# Patient Record
Sex: MALE | Race: WHITE | Employment: FULL TIME | URBAN - NONMETROPOLITAN AREA
[De-identification: names, ages, dates, MRNs, and addresses within clinical notes are randomized per-mention and may not be internally consistent; named-entity substitution may affect disease eponyms.]

---

## 2021-04-15 ENCOUNTER — OFFICE VISIT (OUTPATIENT)
Dept: FAMILY MEDICINE | Facility: OTHER | Age: 51
End: 2021-04-15
Attending: PHYSICIAN ASSISTANT
Payer: COMMERCIAL

## 2021-04-15 ENCOUNTER — TELEPHONE (OUTPATIENT)
Dept: SURGERY | Facility: OTHER | Age: 51
End: 2021-04-15

## 2021-04-15 VITALS
SYSTOLIC BLOOD PRESSURE: 114 MMHG | OXYGEN SATURATION: 96 % | WEIGHT: 257.6 LBS | DIASTOLIC BLOOD PRESSURE: 80 MMHG | TEMPERATURE: 95.9 F | RESPIRATION RATE: 16 BRPM | HEIGHT: 74 IN | HEART RATE: 82 BPM | BODY MASS INDEX: 33.06 KG/M2

## 2021-04-15 DIAGNOSIS — Z13.21 ENCOUNTER FOR VITAMIN DEFICIENCY SCREENING: ICD-10-CM

## 2021-04-15 DIAGNOSIS — Z13.220 SCREENING CHOLESTEROL LEVEL: ICD-10-CM

## 2021-04-15 DIAGNOSIS — Z12.11 COLON CANCER SCREENING: ICD-10-CM

## 2021-04-15 DIAGNOSIS — Z00.00 ROUTINE HISTORY AND PHYSICAL EXAMINATION OF ADULT: Primary | ICD-10-CM

## 2021-04-15 DIAGNOSIS — Z13.1 DIABETES MELLITUS SCREENING: ICD-10-CM

## 2021-04-15 DIAGNOSIS — Z13.29 SCREENING FOR THYROID DISORDER: ICD-10-CM

## 2021-04-15 LAB — INTERPRETATION ECG - MUSE: NORMAL

## 2021-04-15 PROCEDURE — 99386 PREV VISIT NEW AGE 40-64: CPT | Performed by: PHYSICIAN ASSISTANT

## 2021-04-15 PROCEDURE — 93000 ELECTROCARDIOGRAM COMPLETE: CPT | Performed by: INTERNAL MEDICINE

## 2021-04-15 SDOH — HEALTH STABILITY: MENTAL HEALTH: HOW MANY STANDARD DRINKS CONTAINING ALCOHOL DO YOU HAVE ON A TYPICAL DAY?: 1 OR 2

## 2021-04-15 SDOH — HEALTH STABILITY: MENTAL HEALTH: HOW OFTEN DO YOU HAVE 6 OR MORE DRINKS ON ONE OCCASION?: NEVER

## 2021-04-15 SDOH — HEALTH STABILITY: MENTAL HEALTH: HOW OFTEN DO YOU HAVE A DRINK CONTAINING ALCOHOL?: MONTHLY OR LESS

## 2021-04-15 ASSESSMENT — PAIN SCALES - GENERAL: PAINLEVEL: NO PAIN (0)

## 2021-04-15 ASSESSMENT — MIFFLIN-ST. JEOR: SCORE: 2089.25

## 2021-04-15 NOTE — PROGRESS NOTES
"SUBJECTIVE:   HPI  Geraldo Merida is a 50 year old male here for routine history physical exam and colon cancer screening.    Patient states he was in a cholesterol medication approximately 5 years ago but ran out of that medication after the first prescription and did not refill it.  He also reports a possible history of acid reflux for about 5 years that was responsive to Tums.  He states he cut out bread and this helped his symptoms.  On occasion he still has symptoms maybe once or twice a week.  Other than a possible history of hypercholesterolemia and acid reflux he denies any other medical conditions or disorders.  He rarely drinks alcohol and has never used any tobacco products.  No major surgeries or recent illnesses.  He states his father had a double lung transplant in his 50s that they suspect was due to chemical exposure in the mines.  He also has a sister with uterine cancer.  He has 5 sisters who are all alive.  No other diabetes, obesity, or heart disease in his family.  He is due for colon cancer screening but denies any blood in his stool or GI upset.  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  No concern for anxiety or depression.    Allergies:  No Known Allergies    ROS:  As above otherwise unremarkable.     OBJECTIVE:   /80 (BP Location: Right arm, Patient Position: Sitting, Cuff Size: Adult Regular)   Pulse 82   Temp 95.9  F (35.5  C) (Tympanic)   Resp 16   Ht 1.873 m (6' 1.75\")   Wt 116.8 kg (257 lb 9.6 oz)   SpO2 96%   BMI 33.30 kg/m    Physical Exam  General Appearance: Pleasant, alert, appropriate appearance for age and circumstances, no acute distress  Head: Normocephalic, atraumatic  Eyes: PERRL, EOMI  Ears: TM's pearly gray and intact bilaterally. Normal auditory canals and external ears   OroPharynx: Dental hygiene adequate. Normal buccal mucosa. Normal pharynx. No exudates or petechia noted.  Neck: Supple, no masses or lymphadenopathy. Thyroid " smooth and rubbery in texture without palpable nodules  Lungs: Normal chest wall and respirations. Clear to auscultation, no wheezes, rales, rhonchi, or stridor  Cardiovascular: Regular rate and rhythm. S1 and S2 audible, no murmurs, clicks, rubs, or gallops  Gastrointestinal: Abd symmetrical, soft, nontender, no masses, guarding, or tympany, normoactive bowel sounds  Musculoskeletal: No discernable muscle atrophy or weakness; full joint range of motion, no instability, redness, swelling, or tenderness; no discernable spine deviation or gait abnormalities  Skin: no concerning or new rashes  Vascular: Radial, posterior tibial, and dorsalis pedis pulses equal bilaterally and not bounding or thready; No carotid bruit on auscultation; No jugular venous distention with liver palpation; No lower extremity edema or varicose veins.  Neurologic Exam: CN 2-12 grossly intact.  Normal gait.  Symmetric DTRs, no focal motor or sensory deficits. No tremor.  Psychiatric Exam: Alert and oriented, appropriate affect    Diagnostic Test Results:  Results for orders placed or performed in visit on 04/15/21   EKG 12-lead complete w/read - Clinics     Status: None (Preliminary result)   Result Value Ref Range    Interpretation ECG Click View Image link to view waveform and result      ASSESSMENT/PLAN:     1. Routine history and physical examination of adult    2. Encounter for vitamin deficiency screening    3. Screening for thyroid disorder    4. Screening cholesterol level    5. Diabetes mellitus screening    6. Colon cancer screening        He is not fasting today so he will come for a lab only visit for cholesterol screening.  Also obtain a TSH, vitamin D, CMP and CBC at that time.    Referral placed for screening colonoscopy and upper endoscopy due to history of acid reflux.    EKG personally reviewed and shows normal sinus rhythm without ectopy.  Rate of 64.    We also discussed flu shot and COVID-19 vaccinations today.  He denied  these at this time and will consider his options over the COVID-19 vaccine.    Depending on lab work  and colonoscopy we will follow-up in 1 year for routine history and physical exam.    Orders Placed This Encounter   Procedures     CBC and Differential     Comprehensive Metabolic Panel     Lipid Panel     TSH Reflex GH     Vitamin D Total     Comprehensive Metabolic Panel     GASTROENTEROLOGY ADULT REF PROCEDURE ONLY     EKG 12-lead complete w/read - Clinics     Total time spent with this patient was 30 minutes which included chart review, visualization and interpretation of images, time spent with the patient, and documentation.    SONIYA Peck  April 15, 2021  9:37 AM

## 2021-04-15 NOTE — NURSING NOTE
"Patient presents to the clinic today for a prostate exam.  Beth Hill LPN 4/15/2021   9:06 AM    Chief Complaint   Patient presents with     Prostate Problem       Initial /80 (BP Location: Right arm, Patient Position: Sitting, Cuff Size: Adult Regular)   Pulse 82   Temp 95.9  F (35.5  C) (Tympanic)   Resp 16   Ht 1.873 m (6' 1.75\")   Wt 116.8 kg (257 lb 9.6 oz)   SpO2 96%   BMI 33.30 kg/m   Estimated body mass index is 33.3 kg/m  as calculated from the following:    Height as of this encounter: 1.873 m (6' 1.75\").    Weight as of this encounter: 116.8 kg (257 lb 9.6 oz).  Medication Reconciliation: complete  Beth Hill LPN    "

## 2021-04-16 ENCOUNTER — TELEPHONE (OUTPATIENT)
Dept: FAMILY MEDICINE | Facility: OTHER | Age: 51
End: 2021-04-16

## 2021-04-16 ENCOUNTER — TELEPHONE (OUTPATIENT)
Dept: SURGERY | Facility: OTHER | Age: 51
End: 2021-04-16

## 2021-04-16 DIAGNOSIS — E78.00 HYPERCHOLESTEREMIA: Primary | ICD-10-CM

## 2021-04-16 DIAGNOSIS — Z00.00 ROUTINE HISTORY AND PHYSICAL EXAMINATION OF ADULT: ICD-10-CM

## 2021-04-16 DIAGNOSIS — Z13.29 SCREENING FOR THYROID DISORDER: ICD-10-CM

## 2021-04-16 DIAGNOSIS — Z13.21 ENCOUNTER FOR VITAMIN DEFICIENCY SCREENING: ICD-10-CM

## 2021-04-16 DIAGNOSIS — Z13.1 DIABETES MELLITUS SCREENING: ICD-10-CM

## 2021-04-16 DIAGNOSIS — Z13.220 SCREENING CHOLESTEROL LEVEL: ICD-10-CM

## 2021-04-16 LAB
ALBUMIN SERPL-MCNC: 4.1 G/DL (ref 3.5–5.7)
ALP SERPL-CCNC: 34 U/L (ref 34–104)
ALT SERPL W P-5'-P-CCNC: 46 U/L (ref 7–52)
ANION GAP SERPL CALCULATED.3IONS-SCNC: 4 MMOL/L (ref 3–14)
AST SERPL W P-5'-P-CCNC: 17 U/L (ref 13–39)
BASOPHILS # BLD AUTO: 0 10E9/L (ref 0–0.2)
BASOPHILS NFR BLD AUTO: 0.4 %
BILIRUB SERPL-MCNC: 0.4 MG/DL (ref 0.3–1)
BUN SERPL-MCNC: 21 MG/DL (ref 7–25)
CALCIUM SERPL-MCNC: 9.2 MG/DL (ref 8.6–10.3)
CHLORIDE SERPL-SCNC: 105 MMOL/L (ref 98–107)
CHOLEST SERPL-MCNC: 229 MG/DL
CO2 SERPL-SCNC: 29 MMOL/L (ref 21–31)
CREAT SERPL-MCNC: 0.99 MG/DL (ref 0.7–1.3)
DEPRECATED CALCIDIOL+CALCIFEROL SERPL-MC: 15.8 NG/ML
DIFFERENTIAL METHOD BLD: NORMAL
EOSINOPHIL # BLD AUTO: 0.1 10E9/L (ref 0–0.7)
EOSINOPHIL NFR BLD AUTO: 2.5 %
ERYTHROCYTE [DISTWIDTH] IN BLOOD BY AUTOMATED COUNT: 12.1 % (ref 10–15)
GFR SERPL CREATININE-BSD FRML MDRD: 80 ML/MIN/{1.73_M2}
GLUCOSE SERPL-MCNC: 108 MG/DL (ref 70–105)
HCT VFR BLD AUTO: 48.3 % (ref 40–53)
HDLC SERPL-MCNC: 36 MG/DL (ref 23–92)
HGB BLD-MCNC: 16.6 G/DL (ref 13.3–17.7)
IMM GRANULOCYTES # BLD: 0 10E9/L (ref 0–0.4)
IMM GRANULOCYTES NFR BLD: 0.2 %
LDLC SERPL CALC-MCNC: 170 MG/DL
LYMPHOCYTES # BLD AUTO: 1.7 10E9/L (ref 0.8–5.3)
LYMPHOCYTES NFR BLD AUTO: 30.9 %
MCH RBC QN AUTO: 31.3 PG (ref 26.5–33)
MCHC RBC AUTO-ENTMCNC: 34.4 G/DL (ref 31.5–36.5)
MCV RBC AUTO: 91 FL (ref 78–100)
MONOCYTES # BLD AUTO: 0.5 10E9/L (ref 0–1.3)
MONOCYTES NFR BLD AUTO: 8.8 %
NEUTROPHILS # BLD AUTO: 3.2 10E9/L (ref 1.6–8.3)
NEUTROPHILS NFR BLD AUTO: 57.2 %
NONHDLC SERPL-MCNC: 193 MG/DL
PLATELET # BLD AUTO: 183 10E9/L (ref 150–450)
POTASSIUM SERPL-SCNC: 4.4 MMOL/L (ref 3.5–5.1)
PROT SERPL-MCNC: 6.8 G/DL (ref 6.4–8.9)
RBC # BLD AUTO: 5.3 10E12/L (ref 4.4–5.9)
SODIUM SERPL-SCNC: 138 MMOL/L (ref 134–144)
TRIGL SERPL-MCNC: 115 MG/DL
TSH SERPL DL<=0.05 MIU/L-ACNC: 2.13 IU/ML (ref 0.34–5.6)
WBC # BLD AUTO: 5.6 10E9/L (ref 4–11)

## 2021-04-16 PROCEDURE — 82306 VITAMIN D 25 HYDROXY: CPT | Mod: ZL | Performed by: PHYSICIAN ASSISTANT

## 2021-04-16 PROCEDURE — 80053 COMPREHEN METABOLIC PANEL: CPT | Mod: ZL | Performed by: PHYSICIAN ASSISTANT

## 2021-04-16 PROCEDURE — 84443 ASSAY THYROID STIM HORMONE: CPT | Mod: ZL | Performed by: PHYSICIAN ASSISTANT

## 2021-04-16 PROCEDURE — 85025 COMPLETE CBC W/AUTO DIFF WBC: CPT | Mod: ZL | Performed by: PHYSICIAN ASSISTANT

## 2021-04-16 PROCEDURE — 80061 LIPID PANEL: CPT | Mod: ZL | Performed by: PHYSICIAN ASSISTANT

## 2021-04-16 PROCEDURE — 36415 COLL VENOUS BLD VENIPUNCTURE: CPT | Mod: ZL | Performed by: PHYSICIAN ASSISTANT

## 2021-04-16 RX ORDER — ATORVASTATIN CALCIUM 40 MG/1
40 TABLET, FILM COATED ORAL DAILY
Qty: 90 TABLET | Refills: 3 | Status: SHIPPED | OUTPATIENT
Start: 2021-04-16

## 2021-04-16 NOTE — TELEPHONE ENCOUNTER
Patient referred by Dr. Laguna for a screening colonoscopy and Upper GI endoscopy ,  Diagnosis for Egd is acid reflux.   Please advise if ok to schedule.   Thank you.Lisa Guzman on 4/16/2021 at 8:10 AM

## 2021-04-19 ENCOUNTER — HOSPITAL ENCOUNTER (OUTPATIENT)
Facility: OTHER | Age: 51
End: 2021-04-19
Attending: SURGERY | Admitting: SURGERY
Payer: COMMERCIAL

## 2021-04-19 DIAGNOSIS — Z01.818 PRE-OP TESTING: Primary | ICD-10-CM

## 2021-04-19 DIAGNOSIS — Z12.11 SCREENING FOR COLON CANCER: ICD-10-CM

## 2021-04-19 NOTE — TELEPHONE ENCOUNTER
Screening Questions for the Scheduling of Screening Colonoscopies   (If Colonoscopy is diagnostic, Provider should review the chart before scheduling.)  Are you younger than 50 or older than 80?   NO   Do you take aspirin or fish oil?  NO  (if yes, tell patient to stop 1 week prior to Colonoscopy)  Do you take warfarin (Coumadin), clopidogrel (Plavix), apixaban (Eliquis), dabigatram (Pradaxa), rivaroxaban (Xarelto) or any blood thinner? NO   Do you use oxygen at home?  NO   Do you have kidney disease? NO   Are you on dialysis? NO   Have you had a stroke or heart attack in the last year? NO   Have you had a stent in your heart or any blood vessel in the last year? NO   Have you had a transplant of any organ? NO   Have you had a colonoscopy or upper endoscopy (EGD) before? NO          When?    Date of scheduled Colonoscopy/ EGD  [facility]15[facility]2021  [facility] [place]  [place]

## 2021-04-29 RX ORDER — POLYETHYLENE GLYCOL 3350 17 G/17G
POWDER, FOR SOLUTION ORAL
Qty: 510 G | Refills: 0 | Status: SHIPPED | OUTPATIENT
Start: 2021-04-29

## 2021-04-29 RX ORDER — BISACODYL 5 MG/1
TABLET, DELAYED RELEASE ORAL
Qty: 2 TABLET | Refills: 0 | Status: SHIPPED | OUTPATIENT
Start: 2021-04-29